# Patient Record
Sex: MALE | Race: WHITE | NOT HISPANIC OR LATINO | Employment: OTHER | URBAN - METROPOLITAN AREA
[De-identification: names, ages, dates, MRNs, and addresses within clinical notes are randomized per-mention and may not be internally consistent; named-entity substitution may affect disease eponyms.]

---

## 2018-04-14 ENCOUNTER — APPOINTMENT (OUTPATIENT)
Dept: GENERAL RADIOLOGY | Facility: CLINIC | Age: 63
End: 2018-04-14
Attending: FAMILY MEDICINE
Payer: COMMERCIAL

## 2018-04-14 ENCOUNTER — HOSPITAL ENCOUNTER (EMERGENCY)
Facility: CLINIC | Age: 63
Discharge: HOME | End: 2018-04-14
Attending: FAMILY MEDICINE | Admitting: FAMILY MEDICINE
Payer: COMMERCIAL

## 2018-04-14 VITALS
WEIGHT: 170 LBS | BODY MASS INDEX: 25.1 KG/M2 | OXYGEN SATURATION: 97 % | DIASTOLIC BLOOD PRESSURE: 96 MMHG | SYSTOLIC BLOOD PRESSURE: 157 MMHG | HEART RATE: 76 BPM | TEMPERATURE: 98.1 F | RESPIRATION RATE: 16 BRPM

## 2018-04-14 DIAGNOSIS — S61.217A LACERATION OF LEFT LITTLE FINGER WITHOUT FOREIGN BODY WITHOUT DAMAGE TO NAIL, INITIAL ENCOUNTER: ICD-10-CM

## 2018-04-14 PROCEDURE — 12001 RPR S/N/AX/GEN/TRNK 2.5CM/<: CPT | Performed by: FAMILY MEDICINE

## 2018-04-14 PROCEDURE — 73140 X-RAY EXAM OF FINGER(S): CPT | Mod: LT

## 2018-04-14 PROCEDURE — 99282 EMERGENCY DEPT VISIT SF MDM: CPT | Mod: 25 | Performed by: FAMILY MEDICINE

## 2018-04-14 PROCEDURE — 12001 RPR S/N/AX/GEN/TRNK 2.5CM/<: CPT | Mod: Z6 | Performed by: FAMILY MEDICINE

## 2018-04-14 PROCEDURE — 99283 EMERGENCY DEPT VISIT LOW MDM: CPT | Performed by: FAMILY MEDICINE

## 2018-04-14 RX ORDER — BUPIVACAINE HYDROCHLORIDE 2.5 MG/ML
15 INJECTION, SOLUTION INFILTRATION; PERINEURAL ONCE
Status: DISCONTINUED | OUTPATIENT
Start: 2018-04-14 | End: 2018-04-14 | Stop reason: HOSPADM

## 2018-04-14 ASSESSMENT — ENCOUNTER SYMPTOMS
ABDOMINAL PAIN: 0
FEVER: 0
PALPITATIONS: 0
COUGH: 0
CHILLS: 0
SHORTNESS OF BREATH: 0
VOMITING: 0
NAUSEA: 0
DYSURIA: 0
FREQUENCY: 0
BLOOD IN STOOL: 0
CONSTIPATION: 0
SORE THROAT: 0
HEADACHES: 0
DIAPHORESIS: 0
DIARRHEA: 0
WHEEZING: 0
SINUS PRESSURE: 0

## 2018-04-14 NOTE — DISCHARGE INSTRUCTIONS
ICD-10-CM    1. Laceration of left little finger without foreign body without damage to nail, initial encounter S61.217A     keep clean and dry. return immed for signs infection. sutures out in 8-10 days.  use glove at work         Extremity Laceration: Stitches, Staples, or Tape  A laceration is a cut through the skin. If it is deep, it may require stitches or staples to close so it can heal. Minor cuts may be treated with surgical tape closures, or skin glue.  X-rays may be done if something may have entered the skin through the cut. You may also need a tetanus shot if you are not up to date on this vaccination.  Home care    Follow the healthcare provider s instructions on how to care for the cut.    Wash your hands with soap and warm water before and after caring for your wound. This is to help prevent infection.    Keep the wound clean and dry. If a bandage was applied and it becomes wet or dirty, replace it. Otherwise, leave it in place for the first 24 hours, then change it once a day or as directed.    If stitches or staples were used, clean the wound daily:    After removing the bandage, wash the area with soap and water. Use a wet cotton swab to loosen and remove any blood or crust that forms.    After cleaning, keep the wound clean and dry. Talk with your healthcare provider before applying any antibiotic ointment to the wound. Reapply the bandage.    You may remove the bandage to shower as usual after the first 24 hours, but don't soak the area in water (no swimming) until the stitches or staples are removed.    If surgical tape closures were used, keep the area clean and dry. If it becomes wet, blot it dry with a towel. Let the surgical tape fall off on its own.    The healthcare provider may prescribe an antibiotic cream or ointment to prevent infection. He or she may also prescribe an antibiotic pill. Don't stop taking this medicine until you have finished the prescribed course or the provider tells  you to stop. The provider may also prescribe medicine for pain. Follow the instructions for taking these medicines.    Avoid activities that may reopen your wound.  Follow-up care  Follow up with your healthcare provider, or as advised. Most skin wounds heal within 10 days. However, an infection may sometimes occur despite proper treatment. Check the wound daily for the signs of infection listed below. Stitches and staples should be removed within 7 to14 days. If surgical tape closures were used, you may remove them after 10 days if they have not fallen off by then.   When to seek medical advice  Call your healthcare provider right away if any of these occur:    Wound bleeding not controlled by direct pressure    Signs of infection, including increasing pain in the wound, increasing wound redness or swelling, or pus or bad odor coming from the wound    Fever of 100.4 F (38 C) or higher or as directed by your healthcare provider    Stitches or staples come apart or fall out or surgical tape falls off before 7 days    Wound edges re-open    Wound changes colors    Numbness occurs around the wound     Decreased movement around the injured area  Date Last Reviewed: 7/1/2017 2000-2017 [employer]. [address] [place] [address]. All rights reserved. This information is not intended as a substitute for professional medical care. Always follow your healthcare professional's instructions.

## 2018-04-14 NOTE — ED AVS SNAPSHOT
[facility] Emergency Department    [address] [address]    Phone:  [phone]    Fax:  [phone]                                       Nadir Griffith   MRN: [id number]    Department:  [facility] Emergency Department   Date of Visit:  4/14/2018           After Visit Summary Signature Page     I have received my discharge instructions, and my questions have been answered. I have discussed any challenges I see with this plan with the nurse or doctor.    ..........................................................................................................................................  Patient/Patient Representative Signature      ..........................................................................................................................................  Patient Representative Print Name and Relationship to Patient    ..................................................               ................................................  Date                                            Time    ..........................................................................................................................................  Reviewed by Signature/Title    ...................................................              ..............................................  Date                                                            Time

## 2018-04-14 NOTE — ED PROVIDER NOTES
History     Chief Complaint   Patient presents with     Hand Pain     L 5th finger injury, shot through with a nail gun     HPI  Nadir Griffith RT handed male  is a 62 year old male who presents after nail gun injury to the left pinky occurring between 1030 to 11 am today when working with a friend.  holding a board while applying the nail gun at an angle on the opposite side of the board and nail passed through board and into finger.  Efrain finger tip away rapidly and nail came loose from this area and bleeding took pressure and time to resolve.      Tdap in 2016      There is no immunization history on file for this patient.      Problem List:    There are no active problems to display for this patient.       Past Medical History:    No past medical history on file.    Past Surgical History:    Past Surgical History:   Procedure Laterality Date     COLONOSCOPY  11/4/2013    Procedure: COMBINED COLONOSCOPY THRU STOMA, BIOPSY BY SNARE;  Colonoscopy;  Surgeon: Garland Hutchinson MD;  Location: [facility]     LASER HOLMIUM LITHOTRIPSY BLADDER N/A 9/19/2016    Procedure: LASER HOLMIUM LITHOTRIPSY BLADDER;  Surgeon: EZEQUIEL Escobar MD;  Location: [facility]       Family History:    No family history on file.    Social History:  Marital Status:  Single [1]  Social History   Substance Use Topics     Smoking status: Former Smoker     Smokeless tobacco: Not on file     Alcohol use Yes        Medications:      potassium citrate (POTASSIUM CITRATE) 10 MEQ (1080 MG) SR tablet   tadalafil (CIALIS) 20 MG tablet   lisinopril (PRINIVIL,ZESTRIL) 20 MG tablet         Review of Systems   Constitutional: Negative for chills, diaphoresis and fever.   HENT: Negative for ear pain, sinus pressure and sore throat.    Eyes: Negative for visual disturbance.   Respiratory: Negative for cough, shortness of breath and wheezing.    Cardiovascular: Negative for chest pain and palpitations.   Gastrointestinal: Negative for abdominal pain, blood in  stool, constipation, diarrhea, nausea and vomiting.   Genitourinary: Negative for dysuria, frequency and urgency.   Skin: Negative for rash.   Neurological: Negative for headaches.   All other systems reviewed and are negative.      Physical Exam   BP: (!) 157/96  Pulse: 76  Temp: 98.1  F (36.7  C)  Resp: 16  Weight: 77.1 kg (170 lb)  SpO2: 97 %      Physical Exam   Constitutional: No distress.   Musculoskeletal:        Left hand: He exhibits tenderness. He exhibits normal range of motion, no bony tenderness and normal capillary refill. Normal sensation noted. Normal strength noted.        Hands:  Skin: He is not diaphoretic.     intact motor function 5th finger left hand at PIP, DIP, MCP in extension, flexion.      ED Course     ED Course     Laceration repair  Date/Time: 4/14/2018 12:45 PM  Performed by: KEVIN TIMMONS  Authorized by: KEIVN TIMMONS   Consent: Verbal consent obtained.  Risks and benefits: risks, benefits and alternatives were discussed  Consent given by: patient  Body area: upper extremity  Location details: left small finger  Laceration length: 1 cm  Foreign bodies: no foreign bodies  Tendon involvement: none  Nerve involvement: none  Vascular damage: no  Anesthesia: digital block    Anesthesia:  Local Anesthetic: bupivacaine 0.25% without epinephrine  Anesthetic total: 10 mL    Sedation:  Patient sedated: no  Irrigation solution: saline  Irrigation method: syringe  Amount of cleaning: standard  Debridement: none  Degree of undermining: none  Skin closure: 4-0 nylon and Ethilon  Number of sutures: 4  Technique: running  Approximation: close  Approximation difficulty: simple  Dressing: gauze roll  Patient tolerance: Patient tolerated the procedure well with no immediate complications                     Critical Care time:  none               Results for orders placed or performed during the hospital encounter of 04/14/18 (from the past 24 hour(s))   Fingers XR, 2-3 views, left    Narrative     LEFT FINGER TWO OR MORE VIEWS  4/14/2018 11:51 AM     HISTORY:  Nail gun injury.       Impression    IMPRESSION: Three views of the left little finger are performed. Soft  tissue defect is noted near the distal aspect of the finger but no  radiopaque foreign body or fracture is appreciated.    JATIN HUERTAS MD       Medications   bupivacaine (MARCAINE) 0.25 % injection 37.5 mg (not administered)       Assessments & Plan (with Medical Decision Making)     MDM: Nadir Griffith is a 62 year old male who presented with a finger injury from nail gun.  This appears to have grazed the outer aspect of the left hand fifth finger resulting in a flap laceration when he pulled his hand away.  This has bled fairly significantly prior to his presentation.  Following digital block with bupivacaine running suture ×4 was used to close the area.  He tolerated this procedure well.  His tetanus is up-to-date.  Discussed follow-up in clinic.  Sutures out in approximately 8-10 days.    I have reviewed the nursing notes.    I have reviewed the findings, diagnosis, plan and need for follow up with the patient.       New Prescriptions    No medications on file       Final diagnoses:   Laceration of left little finger without foreign body without damage to nail, initial encounter - keep clean and dry. return immed for signs infection. sutures out in 8-10 days.  use glove at work       4/14/2018   [facility] EMERGENCY DEPARTMENT     Reece Morales MD  04/14/18 [id number]

## 2018-04-14 NOTE — ED AVS SNAPSHOT
[facility] Emergency Department    [address] [address]    Phone:  [phone]    Fax:  [phone]                                       Nadir Griffith   MRN: [id number]    Department:  [facility] Emergency Department   Date of Visit:  4/14/2018           Patient Information     Date Of Birth          1955        Your diagnoses for this visit were:     Laceration of left little finger without foreign body without damage to nail, initial encounter keep clean and dry. return immed for signs infection. sutures out in 8-10 days.  use glove at work       You were seen by Reece Morales MD.      Follow-up Information     Follow up with Clinic, [facility] In 8 days.    Contact information:    [facility] [facility] [id number] [phone]          Follow up with [facility] Emergency Department.    Specialty:  EMERGENCY MEDICINE    Why:  As needed, If symptoms worsen    Contact information:    [address] [place] [address] [phone]    Additional information:    The medical center is located at   [address] (between [address] in [place], [place] miles north   of [place]).        Discharge Instructions         ICD-10-CM    1. Laceration of left little finger without foreign body without damage to nail, initial encounter S61.217A     keep clean and dry. return immed for signs infection. sutures out in 8-10 days.  use glove at work         Extremity Laceration: Stitches, Staples, or Tape  A laceration is a cut through the skin. If it is deep, it may require stitches or staples to close so it can heal. Minor cuts may be treated with surgical tape closures, or skin glue.  X-rays may be done if something may have entered the skin through the cut. You may also need a tetanus shot if you are not up to date on this vaccination.  Home care    Follow the healthcare provider s instructions on how to care for the cut.    Wash your  hands with soap and warm water before and after caring for your wound. This is to help prevent infection.    Keep the wound clean and dry. If a bandage was applied and it becomes wet or dirty, replace it. Otherwise, leave it in place for the first 24 hours, then change it once a day or as directed.    If stitches or staples were used, clean the wound daily:    After removing the bandage, wash the area with soap and water. Use a wet cotton swab to loosen and remove any blood or crust that forms.    After cleaning, keep the wound clean and dry. Talk with your healthcare provider before applying any antibiotic ointment to the wound. Reapply the bandage.    You may remove the bandage to shower as usual after the first 24 hours, but don't soak the area in water (no swimming) until the stitches or staples are removed.    If surgical tape closures were used, keep the area clean and dry. If it becomes wet, blot it dry with a towel. Let the surgical tape fall off on its own.    The healthcare provider may prescribe an antibiotic cream or ointment to prevent infection. He or she may also prescribe an antibiotic pill. Don't stop taking this medicine until you have finished the prescribed course or the provider tells you to stop. The provider may also prescribe medicine for pain. Follow the instructions for taking these medicines.    Avoid activities that may reopen your wound.  Follow-up care  Follow up with your healthcare provider, or as advised. Most skin wounds heal within 10 days. However, an infection may sometimes occur despite proper treatment. Check the wound daily for the signs of infection listed below. Stitches and staples should be removed within 7 to14 days. If surgical tape closures were used, you may remove them after 10 days if they have not fallen off by then.   When to seek medical advice  Call your healthcare provider right away if any of these occur:    Wound bleeding not controlled by direct  pressure    Signs of infection, including increasing pain in the wound, increasing wound redness or swelling, or pus or bad odor coming from the wound    Fever of 100.4 F (38 C) or higher or as directed by your healthcare provider    Stitches or staples come apart or fall out or surgical tape falls off before 7 days    Wound edges re-open    Wound changes colors    Numbness occurs around the wound     Decreased movement around the injured area  Date Last Reviewed: 7/1/2017 2000-2017 [employer]. [address] [place] [address]. All rights reserved. This information is not intended as a substitute for professional medical care. Always follow your healthcare professional's instructions.          24 Hour Appointment Hotline       To make an appointment at any [facility], call [phone] ([phone]). If you don't have a family doctor or clinic, we will help you find one. [facility] clinics are conveniently located to serve the needs of you and your family.             Review of your medicines      Our records show that you are taking the medicines listed below. If these are incorrect, please call your family doctor or clinic.        Dose / Directions Last dose taken    lisinopril 20 MG tablet   Commonly known as:  PRINIVIL/ZESTRIL   Dose:  20 mg        Take 20 mg by mouth   Refills:  0        potassium citrate 10 MEQ (1080 MG) CR tablet   Commonly known as:  UROCIT-K   Dose:  20 mEq   Quantity:  120 tablet        Take 2 tablets (20 mEq) by mouth 2 times daily (with meals)   Refills:  3        tadalafil 20 MG tablet   Commonly known as:  CIALIS   Dose:  20 mg   Quantity:  6 tablet        Take 1 tablet (20 mg) by mouth daily as needed for erectile dysfunction Never use with nitroglycerin, terazosin or doxazosin.   Refills:  3                Procedures and tests performed during your visit     Fingers XR, 2-3 views, left    Laceration repair      Orders Needing Specimen Collection      "None      Pending Results     No orders found from 2018 to 4/15/2018.            Pending Culture Results     No orders found from 2018 to 4/15/2018.            Pending Results Instructions     If you had any lab results that were not finalized at the time of your Discharge, you can call the ED Lab Result RN at [phone]. You will be contacted by this team for any positive Lab results or changes in treatment. The nurses are available 7 days a week from 10A to 6:30P.  You can leave a message 24 hours per day and they will return your call.        Test Results From Your Hospital Stay        2018 12:09 PM      Narrative     LEFT FINGER TWO OR MORE VIEWS  2018 11:51 AM     HISTORY:  Nail gun injury.         Impression     IMPRESSION: Three views of the left little finger are performed. Soft  tissue defect is noted near the distal aspect of the finger but no  radiopaque foreign body or fracture is appreciated.    JATIN HUERTAS MD                Thank you for choosing [facility]       Thank you for choosing [facility] for your care. Our goal is always to provide you with excellent care. Hearing back from our patients is one way we can continue to improve our services. Please take a few minutes to complete the written survey that you may receive in the mail after you visit with us. Thank you!        [facility] Information     [facility] lets you send messages to your doctor, view your test results, renew your prescriptions, schedule appointments and more. To sign up, go to [email or web address] . Click on \"Log in\" on the left side of the screen, which will take you to the Welcome page. Then click on \"Sign up Now\" on the right side of the page.     You will be asked to enter the access code listed below, as well as some personal information. Please follow the directions to create your username and password.     Your access code is: [email or web address]  Expires: 2018 12:48 PM     Your access code will  in 90 " days. If you need help or a new code, please call your [facility] clinic or [phone].        Care EveryWhere ID     This is your Care EveryWhere ID. This could be used by other organizations to access your [facility] medical records  [id number]        Equal Access to Services     MAYNOR MCCULLOUGH : Abdi Nunn, waselamda luqadaha, qaybta kaalmada yamilka, juliana valencia. So [facility] [phone].    ATENCIÓN: Si habla español, tiene a rubio disposición servicios gratuitos de asistencia lingüística. Llame al [phone].    We comply with applicable federal civil rights laws and [place] laws. We do not discriminate on the basis of race, color, national origin, age, disability, sex, sexual orientation, or gender identity.            After Visit Summary       This is your record. Keep this with you and show to your community pharmacist(s) and doctor(s) at your next visit.

## 2019-01-19 ENCOUNTER — HOSPITAL ENCOUNTER (EMERGENCY)
Facility: CLINIC | Age: 64
Discharge: HOME | End: 2019-01-19
Attending: PHYSICIAN ASSISTANT | Admitting: PHYSICIAN ASSISTANT
Payer: COMMERCIAL

## 2019-01-19 ENCOUNTER — APPOINTMENT (OUTPATIENT)
Dept: GENERAL RADIOLOGY | Facility: CLINIC | Age: 64
End: 2019-01-19
Payer: COMMERCIAL

## 2019-01-19 VITALS
BODY MASS INDEX: 27.32 KG/M2 | RESPIRATION RATE: 18 BRPM | SYSTOLIC BLOOD PRESSURE: 155 MMHG | WEIGHT: 185 LBS | OXYGEN SATURATION: 94 % | HEART RATE: 111 BPM | DIASTOLIC BLOOD PRESSURE: 94 MMHG | TEMPERATURE: 98 F

## 2019-01-19 DIAGNOSIS — S61.313A LACERATION OF LEFT MIDDLE FINGER WITHOUT FOREIGN BODY WITH DAMAGE TO NAIL, INITIAL ENCOUNTER: ICD-10-CM

## 2019-01-19 PROCEDURE — 99213 OFFICE O/P EST LOW 20 MIN: CPT | Mod: Z6 | Performed by: PHYSICIAN ASSISTANT

## 2019-01-19 PROCEDURE — 73140 X-RAY EXAM OF FINGER(S): CPT | Mod: LT

## 2019-01-19 PROCEDURE — G0463 HOSPITAL OUTPT CLINIC VISIT: HCPCS | Performed by: PHYSICIAN ASSISTANT

## 2019-01-19 ASSESSMENT — ENCOUNTER SYMPTOMS
CONSTITUTIONAL NEGATIVE: 1
MUSCULOSKELETAL NEGATIVE: 1
RESPIRATORY NEGATIVE: 1
NEUROLOGICAL NEGATIVE: 1
CARDIOVASCULAR NEGATIVE: 1
WOUND: 1

## 2019-01-19 NOTE — ED AVS SNAPSHOT
[facility] Emergency Department  [address] [address]  Phone:  [phone]  Fax:  [phone]                                    Nadir Griffith   MRN: [id number]    Department:  [facility] Emergency Department   Date of Visit:  1/19/2019           After Visit Summary Signature Page    I have received my discharge instructions, and my questions have been answered. I have discussed any challenges I see with this plan with the nurse or doctor.    ..........................................................................................................................................  Patient/Patient Representative Signature      ..........................................................................................................................................  Patient Representative Print Name and Relationship to Patient    ..................................................               ................................................  Date                                   Time    ..........................................................................................................................................  Reviewed by Signature/Title    ...................................................              ..............................................  Date                                               Time          22EPIC Rev 08/18

## 2019-01-19 NOTE — ED PROVIDER NOTES
History     Chief Complaint   Patient presents with     Laceration     HPI  Nadir Griffith is a 63 year old male who presents with complaints of laceration to tip of left middle finger.  Patient states he accidentally cut his finger against a table saw.  This occurred just prior to arrival.  He is moving his finger without difficulties.  Bleeding is controlled.  Patient's last tetanus was in 2016.      Allergies:  Allergies   Allergen Reactions     Nka [No Known Allergies]        Problem List:    There are no active problems to display for this patient.       Past Medical History:    History reviewed. No pertinent past medical history.    Past Surgical History:    Past Surgical History:   Procedure Laterality Date     COLONOSCOPY  11/4/2013    Procedure: COMBINED COLONOSCOPY THRU STOMA, BIOPSY BY SNARE;  Colonoscopy;  Surgeon: Garland Hutchinson MD;  Location: [facility]     LASER HOLMIUM LITHOTRIPSY BLADDER N/A 9/19/2016    Procedure: LASER HOLMIUM LITHOTRIPSY BLADDER;  Surgeon: EZEQUIEL Escobar MD;  Location: [facility]       Family History:    History reviewed. No pertinent family history.    Social History:  Marital Status:  Single [1]  Social History     Tobacco Use     Smoking status: Former Smoker   Substance Use Topics     Alcohol use: Yes     Drug use: Not on file        Medications:      lisinopril (PRINIVIL,ZESTRIL) 20 MG tablet   potassium citrate (POTASSIUM CITRATE) 10 MEQ (1080 MG) SR tablet   tadalafil (CIALIS) 20 MG tablet         Review of Systems   Constitutional: Negative.    Respiratory: Negative.    Cardiovascular: Negative.    Musculoskeletal: Negative.    Skin: Positive for wound.   Neurological: Negative.    All other systems reviewed and are negative.      Physical Exam   BP: (!) 155/94  Pulse: 111  Temp: 98  F (36.7  C)  Resp: 18  Weight: 83.9 kg (185 lb)  SpO2: 94 %      Physical Exam   Constitutional: He appears well-developed and well-nourished. No distress.   HENT:   Head: Normocephalic and  atraumatic.   Eyes: Conjunctivae are normal.   Neck: Neck supple.   Pulmonary/Chest: Effort normal.   Musculoskeletal: Normal range of motion.        Left hand: He exhibits tenderness and laceration. He exhibits normal range of motion, normal capillary refill, no deformity and no swelling. Normal sensation noted. Normal strength noted.   2 cm jagged laceration to distal left middle finger.  Wound is void of a piece of tissue.  Wound extends to the lateral aspect of the nail.  No tendon involvement.  Full active and passive ROM of finger at all joints.  Full strength of finger joints with flexion and extension against resistance.  Sensation intact.   Neurological: He is alert. He has normal strength. No sensory deficit.   Skin: Skin is warm and dry.       ED Course        Procedures    Results for orders placed or performed during the hospital encounter of 01/19/19 (from the past 24 hour(s))   Fingers XR, 2-3 views, left    Narrative    XR FINGER LT G/E 2 VW 1/19/2019 2:41 PM     HISTORY: table saw laceration to tip of finger      Impression    IMPRESSION: Negative exam. No apparent radiopaque foreign body.    LUIS DUENAS MD       Medications - No data to display    Assessments & Plan (with Medical Decision Making)     Pt is a 63 year old male who presents with complaints of laceration to tip of left middle finger.  Patient states he accidentally cut his finger against a table saw.  This occurred just prior to arrival.  He is moving his finger without difficulties.  Bleeding is controlled.  Patient's last tetanus was in 2016.  Pt is afebrile on arrival.  Exam as above.  X-rays of left middle finger were negative for fracture or foreign body.  Discussed results with patient.  Patient's laceration was extensively irrigated and a tube gauze dressing was applied.  There is nothing to suture at this time as the wound is void of a piece of tissue.  Return precautions were reviewed.  Hand-outs were provided.    Patient  was instructed to follow-up with PCP if no improvement in 5-7 days for continued care and management or sooner if new or worsening symptoms.  He is to return to the ED for persistent and/or worsening symptoms.  Patient expressed understanding of the diagnosis and plan and was discharged home in good condition.    I have reviewed the nursing notes.    I have reviewed the findings, diagnosis, plan and need for follow up with the patient.       Medication List      There are no discharge medications for this visit.         Final diagnoses:   Laceration of left middle finger without foreign body with damage to nail, initial encounter       1/19/2019   [facility] EMERGENCY DEPARTMENT      Disclaimer:  This note consists of symbols derived from keyboarding, dictation and/or voice recognition software.  As a result, there may be errors in the script that have gone undetected.  Please consider this when interpreting information found in this chart.     Maren Simon PA-C  01/19/19 1944

## 2020-09-08 ENCOUNTER — HOSPITAL ENCOUNTER (EMERGENCY)
Facility: CLINIC | Age: 65
Discharge: HOME | End: 2020-09-09
Attending: EMERGENCY MEDICINE | Admitting: EMERGENCY MEDICINE
Payer: COMMERCIAL

## 2020-09-08 DIAGNOSIS — R31.0 GROSS HEMATURIA: ICD-10-CM

## 2020-09-08 DIAGNOSIS — N30.01 ACUTE CYSTITIS WITH HEMATURIA: ICD-10-CM

## 2020-09-08 LAB
ALBUMIN UR-MCNC: >499 MG/DL
ANION GAP SERPL CALCULATED.3IONS-SCNC: 6 MMOL/L (ref 3–14)
APPEARANCE UR: ABNORMAL
BASOPHILS # BLD AUTO: 0 10E9/L (ref 0–0.2)
BASOPHILS NFR BLD AUTO: 0.6 %
BILIRUB UR QL STRIP: NEGATIVE
BUN SERPL-MCNC: 27 MG/DL (ref 7–30)
CALCIUM SERPL-MCNC: 8.4 MG/DL (ref 8.5–10.1)
CHLORIDE SERPL-SCNC: 107 MMOL/L (ref 94–109)
CO2 SERPL-SCNC: 26 MMOL/L (ref 20–32)
COLOR UR AUTO: YELLOW
CREAT SERPL-MCNC: 1.11 MG/DL (ref 0.66–1.25)
DIFFERENTIAL METHOD BLD: NORMAL
EOSINOPHIL # BLD AUTO: 0.1 10E9/L (ref 0–0.7)
EOSINOPHIL NFR BLD AUTO: 0.9 %
ERYTHROCYTE [DISTWIDTH] IN BLOOD BY AUTOMATED COUNT: 12.6 % (ref 10–15)
GFR SERPL CREATININE-BSD FRML MDRD: 70 ML/MIN/{1.73_M2}
GLUCOSE SERPL-MCNC: 108 MG/DL (ref 70–99)
GLUCOSE UR STRIP-MCNC: 50 MG/DL
HCT VFR BLD AUTO: 44.8 % (ref 40–53)
HGB BLD-MCNC: 15.2 G/DL (ref 13.3–17.7)
HGB UR QL STRIP: ABNORMAL
IMM GRANULOCYTES # BLD: 0 10E9/L (ref 0–0.4)
IMM GRANULOCYTES NFR BLD: 0.1 %
KETONES UR STRIP-MCNC: NEGATIVE MG/DL
LEUKOCYTE ESTERASE UR QL STRIP: NEGATIVE
LYMPHOCYTES # BLD AUTO: 1.9 10E9/L (ref 0.8–5.3)
LYMPHOCYTES NFR BLD AUTO: 27.4 %
MCH RBC QN AUTO: 31.1 PG (ref 26.5–33)
MCHC RBC AUTO-ENTMCNC: 33.9 G/DL (ref 31.5–36.5)
MCV RBC AUTO: 92 FL (ref 78–100)
MONOCYTES # BLD AUTO: 0.6 10E9/L (ref 0–1.3)
MONOCYTES NFR BLD AUTO: 8.9 %
MUCOUS THREADS #/AREA URNS LPF: PRESENT /LPF
NEUTROPHILS # BLD AUTO: 4.3 10E9/L (ref 1.6–8.3)
NEUTROPHILS NFR BLD AUTO: 62.1 %
NITRATE UR QL: NEGATIVE
NRBC # BLD AUTO: 0 10*3/UL
NRBC BLD AUTO-RTO: 0 /100
PH UR STRIP: 6 PH (ref 5–7)
PLATELET # BLD AUTO: 174 10E9/L (ref 150–450)
POTASSIUM SERPL-SCNC: 3.8 MMOL/L (ref 3.4–5.3)
RBC # BLD AUTO: 4.89 10E12/L (ref 4.4–5.9)
RBC #/AREA URNS AUTO: >182 /HPF (ref 0–2)
SODIUM SERPL-SCNC: 139 MMOL/L (ref 133–144)
SOURCE: ABNORMAL
SP GR UR STRIP: 1.02 (ref 1–1.03)
UROBILINOGEN UR STRIP-MCNC: 0 MG/DL (ref 0–2)
WBC # BLD AUTO: 6.9 10E9/L (ref 4–11)
WBC #/AREA URNS AUTO: >182 /HPF (ref 0–5)
WBC CLUMPS #/AREA URNS HPF: PRESENT /HPF

## 2020-09-08 PROCEDURE — 99284 EMERGENCY DEPT VISIT MOD MDM: CPT | Mod: Z6 | Performed by: EMERGENCY MEDICINE

## 2020-09-08 PROCEDURE — 80048 BASIC METABOLIC PNL TOTAL CA: CPT | Performed by: EMERGENCY MEDICINE

## 2020-09-08 PROCEDURE — 85025 COMPLETE CBC W/AUTO DIFF WBC: CPT | Performed by: EMERGENCY MEDICINE

## 2020-09-08 PROCEDURE — 99283 EMERGENCY DEPT VISIT LOW MDM: CPT | Performed by: EMERGENCY MEDICINE

## 2020-09-08 PROCEDURE — 87086 URINE CULTURE/COLONY COUNT: CPT | Performed by: EMERGENCY MEDICINE

## 2020-09-08 PROCEDURE — 81001 URINALYSIS AUTO W/SCOPE: CPT | Performed by: EMERGENCY MEDICINE

## 2020-09-08 RX ORDER — CEFDINIR 300 MG/1
300 CAPSULE ORAL 2 TIMES DAILY
Qty: 10 CAPSULE | Refills: 0 | Status: SHIPPED | OUTPATIENT
Start: 2020-09-08 | End: 2020-09-13

## 2020-09-08 RX ORDER — CEFDINIR 300 MG/1
300 CAPSULE ORAL ONCE
Status: COMPLETED | OUTPATIENT
Start: 2020-09-08 | End: 2020-09-09

## 2020-09-08 NOTE — LETTER
September 9, 2020      To Whom It May Concern:      Nadir Griffith was seen in our Emergency Department today, 09/09/20.  May return to work on 9/12/2020 without restriction.    Sincerely,        Eduin Leo MD

## 2020-09-08 NOTE — ED AVS SNAPSHOT
[facility] Emergency Department  [address] [address]  Phone:  [phone]  Fax:  [phone]                                    Nadir Griffith   MRN: [id number]    Department:  [facility] Emergency Department   Date of Visit:  9/8/2020           After Visit Summary Signature Page    I have received my discharge instructions, and my questions have been answered. I have discussed any challenges I see with this plan with the nurse or doctor.    ..........................................................................................................................................  Patient/Patient Representative Signature      ..........................................................................................................................................  Patient Representative Print Name and Relationship to Patient    ..................................................               ................................................  Date                                   Time    ..........................................................................................................................................  Reviewed by Signature/Title    ...................................................              ..............................................  Date                                               Time          22EPIC Rev 08/18

## 2020-09-09 VITALS
TEMPERATURE: 98.2 F | DIASTOLIC BLOOD PRESSURE: 96 MMHG | HEART RATE: 80 BPM | BODY MASS INDEX: 27.32 KG/M2 | RESPIRATION RATE: 18 BRPM | SYSTOLIC BLOOD PRESSURE: 134 MMHG | OXYGEN SATURATION: 97 % | WEIGHT: 185 LBS

## 2020-09-09 PROCEDURE — 25000132 ZZH RX MED GY IP 250 OP 250 PS 637: Performed by: EMERGENCY MEDICINE

## 2020-09-09 RX ADMIN — CEFDINIR 300 MG: 300 CAPSULE ORAL at 00:10

## 2020-09-09 ASSESSMENT — ENCOUNTER SYMPTOMS
SHORTNESS OF BREATH: 0
NAUSEA: 0
HEMATURIA: 1
DIARRHEA: 0
CHILLS: 1
FREQUENCY: 1
DYSURIA: 1
ABDOMINAL PAIN: 0
LIGHT-HEADEDNESS: 0
COUGH: 0
BACK PAIN: 0
SORE THROAT: 0
HEADACHES: 0
FEVER: 0
VOMITING: 0

## 2020-09-09 NOTE — ED PROVIDER NOTES
"  History     Chief Complaint   Patient presents with     Hematuria     about 1/2 hr ago, pain at end of voiding     HPI  Nadir Griffith is a 65 year old male with history of renal calculi, bladder stone (status post lithotripsy), hypertension, dyslipidemia, sleep apnea, right bundle branch block, who presents for evaluation of hematuria and dysuria.  Patient reports increased urinary frequency for past few days.  Today he has had some dysuria and this evening he had gross hematuria.  He reports possible chills earlier today but said it was difficult to say because it was cold outside and it was 56 degrees in his house when he returned home.  Has inconsistent urinary stream.  Says sometimes it will start and then stop and then resume again.  Says he had issues like this with his prior stone.  Says he was told to \"wiggle my hips\" which improved urine flow.  Has been doing this intermittently for the past month.  No measured fevers.  Denies nausea, vomiting, back pain, flank pain, abdominal pain.  No urethral discharge.  No scrotal pain.  Says that he did not have much pain with previous kidney stones.  Eating and drinking well.    The patient's PMHx, Surgical Hx, Allergies, and Medications were all reviewed with the patient.    ==================================================================    CHART REVIEW:    CT stone protocol 7/23/2020  KIDNEY/BLADDER: No evidence of calculi in the kidneys or ureters. There are 3  bladder calculi measure 9 mm, 9 mm, and 8 mm respectively with a small  additional 2 mm bladder calculus. No evidence of bladder wall thickening. No  evidence of hydronephrosis. There are several low-attenuation probable left  renal cysts.  IMPRESSION:   1.  No evidence of calculi in the kidneys or ureters.  2.  Multiple bladder calculi.  3.  No evidence of hydronephrosis.  4.  Small sites of pelvic bone sclerosis. Recommend correlation with PSA levels.    END CHART " REVIEW  ==================================================================      Allergies:  Allergies   Allergen Reactions     Nka [No Known Allergies]        Problem List:    There are no active problems to display for this patient.       Past Medical History:    No past medical history on file.    Past Surgical History:    Past Surgical History:   Procedure Laterality Date     COLONOSCOPY  11/4/2013    Procedure: COMBINED COLONOSCOPY THRU STOMA, BIOPSY BY SNARE;  Colonoscopy;  Surgeon: Garland Hutchinson MD;  Location: [facility]     LASER HOLMIUM LITHOTRIPSY BLADDER N/A 9/19/2016    Procedure: LASER HOLMIUM LITHOTRIPSY BLADDER;  Surgeon: EZQEUIEL Escobar MD;  Location: [facility]       Family History:    No family history on file.    Social History:  Marital Status:  Single [1]  Social History     Tobacco Use     Smoking status: Former Smoker   Substance Use Topics     Alcohol use: Yes     Drug use: Not on file        Medications:    cefdinir (OMNICEF) 300 MG capsule  lisinopril (PRINIVIL,ZESTRIL) 20 MG tablet  potassium citrate (POTASSIUM CITRATE) 10 MEQ (1080 MG) SR tablet  tadalafil (CIALIS) 20 MG tablet          Review of Systems   Constitutional: Positive for chills. Negative for fever.   HENT: Negative for sore throat.    Eyes: Negative for visual disturbance.   Respiratory: Negative for cough and shortness of breath.    Cardiovascular: Negative for chest pain.   Gastrointestinal: Negative for abdominal pain, diarrhea, nausea and vomiting.   Endocrine: Negative for polyuria.   Genitourinary: Positive for dysuria, frequency and hematuria. Negative for discharge and testicular pain.   Musculoskeletal: Negative for back pain.   Allergic/Immunologic: Negative for immunocompromised state.   Neurological: Negative for light-headedness and headaches.       Physical Exam   BP: (!) 147/115  Pulse: 91  Temp: 98.2  F (36.8  C)  Resp: 18  Weight: 83.9 kg (185 lb)  SpO2: 97 %    Physical Exam  GEN: Awake, alert, and cooperative.  No acute distress.  Anxious appearing  HENT: MMM. External ears and nose normal bilaterally.  EYES: EOM intact. Conjunctiva clear.   NECK: Supple, symmetric.  CV : Extremities warm and well-perfused.  Brisk capillary refill.  PULM: Normal effort.   ABD: Soft, non-tender, non-distended. No rebound or guarding.   BACK: No CVA tenderness.  No midline spinal tenderness.  No overt signs of trauma.  NEURO: Normal speech. Following commands. CN II-XII grossly intact. Answering questions and interacting appropriately.   EXT: No gross deformity.   INT: Warm. No diaphoresis. Normal color.        ED Course        Procedures           Critical Care time:  none               Results for orders placed or performed during the hospital encounter of 09/08/20 (from the past 24 hour(s))   UA reflex to Microscopic   Result Value Ref Range    Color Urine Yellow     Appearance Urine Cloudy     Glucose Urine 50 (A) NEG^Negative mg/dL    Bilirubin Urine Negative NEG^Negative    Ketones Urine Negative NEG^Negative mg/dL    Specific Gravity Urine 1.022 1.003 - 1.035    Blood Urine Large (A) NEG^Negative    pH Urine 6.0 5.0 - 7.0 pH    Protein Albumin Urine >499 (A) NEG^Negative mg/dL    Urobilinogen mg/dL 0.0 0.0 - 2.0 mg/dL    Nitrite Urine Negative NEG^Negative    Leukocyte Esterase Urine Negative NEG^Negative    Source Midstream Urine     RBC Urine >182 (H) 0 - 2 /HPF    WBC Urine >182 (H) 0 - 5 /HPF    WBC Clumps Present (A) NEG^Negative /HPF    Mucous Urine Present (A) NEG^Negative /LPF   CBC with platelets differential   Result Value Ref Range    WBC 6.9 4.0 - 11.0 10e9/L    RBC Count 4.89 4.4 - 5.9 10e12/L    Hemoglobin 15.2 13.3 - 17.7 g/dL    Hematocrit 44.8 40.0 - 53.0 %    MCV 92 78 - 100 fl    MCH 31.1 26.5 - 33.0 pg    MCHC 33.9 31.5 - 36.5 g/dL    RDW 12.6 10.0 - 15.0 %    Platelet Count 174 150 - 450 10e9/L    Diff Method Automated Method     % Neutrophils 62.1 %    % Lymphocytes 27.4 %    % Monocytes 8.9 %    % Eosinophils  0.9 %    % Basophils 0.6 %    % Immature Granulocytes 0.1 %    Nucleated RBCs 0 0 /100    Absolute Neutrophil 4.3 1.6 - 8.3 10e9/L    Absolute Lymphocytes 1.9 0.8 - 5.3 10e9/L    Absolute Monocytes 0.6 0.0 - 1.3 10e9/L    Absolute Eosinophils 0.1 0.0 - 0.7 10e9/L    Absolute Basophils 0.0 0.0 - 0.2 10e9/L    Abs Immature Granulocytes 0.0 0 - 0.4 10e9/L    Absolute Nucleated RBC 0.0    Basic metabolic panel   Result Value Ref Range    Sodium 139 133 - 144 mmol/L    Potassium 3.8 3.4 - 5.3 mmol/L    Chloride 107 94 - 109 mmol/L    Carbon Dioxide 26 20 - 32 mmol/L    Anion Gap 6 3 - 14 mmol/L    Glucose 108 (H) 70 - 99 mg/dL    Urea Nitrogen 27 7 - 30 mg/dL    Creatinine 1.11 0.66 - 1.25 mg/dL    GFR Estimate 70 >60 mL/min/[1.73_m2]    GFR Estimate If Black 81 >60 mL/min/[1.73_m2]    Calcium 8.4 (L) 8.5 - 10.1 mg/dL       Medications   cefdinir (OMNICEF) capsule 300 mg (300 mg Oral Given 9/9/20 0010)       Assessments & Plan (with Medical Decision Making)   65 year old male with past medical history of bladder calculi, renal calculi, hypertension, hyperlipidemia, right bundle branch block, with several days of urinary frequency, 1 day of dysuria and hematuria.  On arrival to Emergency Department, vital signs were blood pressure 147/115, temperature 98.2, pulse 91, respiratory rate 18.     Urinalysis notable for gross hematuria.  Greater than 182 RBCs and greater than 182 WBCs with white blood cell clumps present.  Symptoms consistent with acute urinary tract infection.  Urine sent for culture and will treat with cefdinir.  First dose given in emergency department.  Chart review shows that patient does have history of bladder calculi with lithotripsy.  Chart review also shows that he had a visit for flank pain at an outside hospital a few months ago and CT scan at that time did show multiple bladder calculi no hydronephrosis no sign of renal calculus.  Patient denies any recent back flank or abdominal pain.  This would  be very atypical for ureterolithiasis.  CBC normal and CMP grossly normal.  No CVA tenderness on exam.  Is afebrile and no tachycardia.  He is frequently passing urine on the emergency department making distal obstruction due to bladder calculi unlikely.  Plan to treat with antibiotics and expect improvement of symptoms in 2 to 3 days.  Can take ibuprofen as needed for pain.  Will need to follow-up with primary care provider and also urology.  If he develops fever, chills, nausea, vomiting, or recommend imaging to evaluate for obstructing stone.  Strict ED return precautions discussed.  Expresses agreement understanding of plan and discharged in stable condition. Urology referral placed.       I have reviewed the nursing notes.         Discharge Medication List as of 9/9/2020 12:16 AM      START taking these medications    Details   cefdinir (OMNICEF) 300 MG capsule Take 1 capsule (300 mg) by mouth 2 times daily for 5 days, Disp-10 capsule,R-0, E-Prescribe             Final diagnoses:   Acute cystitis with hematuria   Gross hematuria     Eduin Leo MD    9/8/2020   [facility] EMERGENCY DEPARTMENT    Disclaimer: This note consists of words and symbols derived from keyboarding and dictation using voice recognition software.  As a result, there may be errors that have gone undetected.  Please consider this when interpreting information found in this note.             Eduin Leo MD  09/09/20 0130

## 2020-09-09 NOTE — DISCHARGE INSTRUCTIONS
You were found to have a urinary tract infection.  Antibiotics twice daily as prescribed for 4 days.  I would expect your symptoms to improve after 2 to 3 days.  You should follow-up with your primary care physician in 3 to 4 days to monitor treatment response and to follow-up on urine culture results.  You should also follow-up with urology for your bladder stones and blood in your urine.  If you develop fever (greater than 100.4), chills, flank or abdominal pain, inability to urinate, or other new or concerning signs or symptoms, please return to emergency department for further evaluation and treatment.  You may also take 400 to 600 mg of ibuprofen 3 times a day for pain and discomfort.

## 2020-09-09 NOTE — ED NOTES
"   Pt arrived via triage, ambulatory, in no acute distress.  Pt is quite anxious, talking fast and pacing.   Pt states he is \"peeing blood\" and it started 30 min ago.  He has frequency and small amounts of BRB in urine.  Pt reports no pain at rest and slight pain at \"the end when I pee\".  Pt reports going to the PCP a couple times in the past few months with flank pain, and dark urine, and they stated everything was fine.  Pt has a h/o a 6mm bladder stone, 3 yrs ago, and feels this is what is happening now.      Pt voided approx 3 ml of urine that is bright red.  Pt states he will be able to void more in about 5 min.    PLAN:  Will send UA and await MD assessment and orders.  "

## 2020-09-09 NOTE — ED NOTES
18g IV started in R AC, labs drawn and sent.  Pt updated on plan of care.  Pt resting comfortably, in no distress, watching TV.

## 2020-09-10 LAB
BACTERIA SPEC CULT: NO GROWTH
SPECIMEN SOURCE: NORMAL

## 2020-09-10 NOTE — RESULT ENCOUNTER NOTE
"Final urine culture report shows \"NO GROWTH\" and is NEGATIVE.  Emergency Dept discharge antibiotic: Cefdinir (Omnicef) 300 mg capsule, 1 capsule (300 mg) by mouth 2 times daily for 5 days  Is ED discharge Rx antibiotic for UTI only (Yes/No): Yes  Recommendations per [facility] ED Lab result protocol - Urine culture protocol."

## 2020-09-11 ENCOUNTER — TELEPHONE (OUTPATIENT)
Dept: UROLOGY | Facility: CLINIC | Age: 65
End: 2020-09-11

## 2020-09-11 NOTE — TELEPHONE ENCOUNTER
Reason for Call:  Other     Detailed comments: Pt thinks he has bladder stones again. States he was urinating blood on 09/08 and was seen in the ER, diagnosed with bladder infection and started on antibiotics. Was called yesterday and told he did not have an infection and to stop the antibiotics. Had bladder stones in 2016.     He has been advised to be seen by urology and no openings for 3 months - Please advise     Phone Number Patient can be reached at: Cell number on file:    Telephone Information:   Mobile [phone]       Best Time: Any    Can we leave a detailed message on this number? YES    Call taken on 9/11/2020 at 9:15 AM by Denise Behrendt

## 2020-09-11 NOTE — TELEPHONE ENCOUNTER
I spoke to Nadir today. He went into the ED recently he was positive for chills frequency hematuria and dysuria so he was thought to have UTI. He had WBC and RBC in his micro but his he did not grow out anything in the UC. He said that back in 2016 he had bladder stones. There was no imaging done in his recent ED visit. I told Nadir that we are not currently doing stone procedures, however he has not even been diagnosed with stones. I told him that he should follow up with his primary physician. He is in agreement with the plan. Maya HOWE Rn

## 2020-09-16 ENCOUNTER — TRANSCRIBE ORDERS (OUTPATIENT)
Dept: OTHER | Age: 65
End: 2020-09-16

## 2020-09-16 DIAGNOSIS — N21.0 BLADDER STONE: Primary | ICD-10-CM

## 2021-04-27 ENCOUNTER — TRANSCRIBE ORDERS (OUTPATIENT)
Dept: OTHER | Age: 66
End: 2021-04-27

## 2021-04-27 DIAGNOSIS — K63.5 POLYP OF COLON, UNSPECIFIED PART OF COLON, UNSPECIFIED TYPE: Primary | ICD-10-CM

## 2021-05-10 DIAGNOSIS — Z11.59 ENCOUNTER FOR SCREENING FOR OTHER VIRAL DISEASES: ICD-10-CM

## 2021-05-20 RX ORDER — TAMSULOSIN HYDROCHLORIDE 0.4 MG/1
CAPSULE ORAL
COMMUNITY
Start: 2021-04-16

## 2021-05-20 RX ORDER — ATORVASTATIN CALCIUM 10 MG/1
10 TABLET, FILM COATED ORAL DAILY
COMMUNITY
Start: 2021-03-27

## 2021-05-21 ENCOUNTER — ANESTHESIA EVENT (OUTPATIENT)
Dept: GASTROENTEROLOGY | Facility: CLINIC | Age: 66
End: 2021-05-21
Payer: COMMERCIAL

## 2021-05-21 DIAGNOSIS — Z11.59 ENCOUNTER FOR SCREENING FOR OTHER VIRAL DISEASES: ICD-10-CM

## 2021-05-21 PROCEDURE — U0005 INFEC AGEN DETEC AMPLI PROBE: HCPCS | Performed by: SURGERY

## 2021-05-21 PROCEDURE — U0003 INFECTIOUS AGENT DETECTION BY NUCLEIC ACID (DNA OR RNA); SEVERE ACUTE RESPIRATORY SYNDROME CORONAVIRUS 2 (SARS-COV-2) (CORONAVIRUS DISEASE [COVID-19]), AMPLIFIED PROBE TECHNIQUE, MAKING USE OF HIGH THROUGHPUT TECHNOLOGIES AS DESCRIBED BY CMS-2020-01-R: HCPCS | Performed by: SURGERY

## 2021-05-21 ASSESSMENT — LIFESTYLE VARIABLES: TOBACCO_USE: 1

## 2021-05-21 NOTE — ANESTHESIA PREPROCEDURE EVALUATION
Anesthesia Pre-Procedure Evaluation    Patient: Nadir Griffith   MRN: [id number] : 1955        Preoperative Diagnosis: Polyp of colon [K63.5]   Procedure : Procedure(s):  COLONOSCOPY     Past Medical History:   Diagnosis Date     Hypertension      Sleep apnea       Past Surgical History:   Procedure Laterality Date     COLONOSCOPY  2013    Procedure: COMBINED COLONOSCOPY THRU STOMA, BIOPSY BY SNARE;  Colonoscopy;  Surgeon: Garland Hutchinson MD;  Location: [facility]     LASER HOLMIUM LITHOTRIPSY BLADDER N/A 2016    Procedure: LASER HOLMIUM LITHOTRIPSY BLADDER;  Surgeon: EZEQUIEL Escobar MD;  Location: [facility]      Allergies   Allergen Reactions     Nka [No Known Allergies]       Social History     Tobacco Use     Smoking status: Former Smoker     Smokeless tobacco: Never Used   Substance Use Topics     Alcohol use: Yes      Wt Readings from Last 1 Encounters:   20 83.9 kg (185 lb)        Anesthesia Evaluation   Pt has had prior anesthetic. Type: General and MAC.        ROS/MED HX  ENT/Pulmonary:     (+) sleep apnea, tobacco use, Past use,     Neurologic:  - neg neurologic ROS     Cardiovascular:     (+) Dyslipidemia hypertension-----    METS/Exercise Tolerance:     Hematologic:  - neg hematologic  ROS     Musculoskeletal:  - neg musculoskeletal ROS     GI/Hepatic:  - neg GI/hepatic ROS     Renal/Genitourinary:  - neg Renal ROS     Endo:  - neg endo ROS     Psychiatric/Substance Use:  - neg psychiatric ROS     Infectious Disease:  - neg infectious disease ROS     Malignancy:  - neg malignancy ROS     Other:  - neg other ROS          Physical Exam    Airway        Mallampati: I   TM distance: > 3 FB   Neck ROM: full   Mouth opening: > 3 cm    Respiratory Devices and Support         Dental  no notable dental history         Cardiovascular   cardiovascular exam normal          Pulmonary   pulmonary exam normal                OUTSIDE LABS:  CBC:   Lab Results   Component Value Date    WBC 6.9  09/08/2020    HGB 15.2 09/08/2020    HCT 44.8 09/08/2020     09/08/2020     BMP:   Lab Results   Component Value Date     09/08/2020    POTASSIUM 3.8 09/08/2020    CHLORIDE 107 09/08/2020    CO2 26 09/08/2020    BUN 27 09/08/2020    CR 1.11 09/08/2020     (H) 09/08/2020     COAGS: No results found for: PTT, INR, FIBR  POC: No results found for: BGM, HCG, HCGS  HEPATIC: No results found for: ALBUMIN, PROTTOTAL, ALT, AST, GGT, ALKPHOS, BILITOTAL, BILIDIRECT, ZACKERY  OTHER:   Lab Results   Component Value Date    KATHY 8.4 (L) 09/08/2020       Anesthesia Plan    ASA Status:  2   NPO Status:  NPO Appropriate    Anesthesia Type: General.   Induction: Propofol.   Maintenance: TIVA.        Consents    Anesthesia Plan(s) and associated risks, benefits, and realistic alternatives discussed. Questions answered and patient/representative(s) expressed understanding.     - Discussed with:  Patient         Postoperative Care    Pain management: IV analgesics, Oral pain medications, Multi-modal analgesia.   PONV prophylaxis: Ondansetron (or other 5HT-3), Dexamethasone or Solumedrol     Comments:                CECI Alves CRNA   pt is selectively mute, as per medical chart he is from [facility]

## 2021-05-22 LAB
SARS-COV-2 RNA RESP QL NAA+PROBE: NOT DETECTED
SPECIMEN SOURCE: NORMAL

## 2021-05-24 ENCOUNTER — HOSPITAL ENCOUNTER (OUTPATIENT)
Facility: CLINIC | Age: 66
Discharge: HOME | End: 2021-05-24
Attending: SURGERY | Admitting: SURGERY
Payer: COMMERCIAL

## 2021-05-24 ENCOUNTER — ANESTHESIA (OUTPATIENT)
Dept: GASTROENTEROLOGY | Facility: CLINIC | Age: 66
End: 2021-05-24
Payer: COMMERCIAL

## 2021-05-24 VITALS
HEIGHT: 69 IN | SYSTOLIC BLOOD PRESSURE: 113 MMHG | HEART RATE: 72 BPM | RESPIRATION RATE: 14 BRPM | BODY MASS INDEX: 27.4 KG/M2 | TEMPERATURE: 98.2 F | DIASTOLIC BLOOD PRESSURE: 80 MMHG | OXYGEN SATURATION: 95 % | WEIGHT: 185 LBS

## 2021-05-24 LAB — COLONOSCOPY: NORMAL

## 2021-05-24 PROCEDURE — 250N000009 HC RX 250: Performed by: SURGERY

## 2021-05-24 PROCEDURE — 250N000009 HC RX 250: Performed by: NURSE ANESTHETIST, CERTIFIED REGISTERED

## 2021-05-24 PROCEDURE — 88305 TISSUE EXAM BY PATHOLOGIST: CPT | Mod: 26 | Performed by: PATHOLOGY

## 2021-05-24 PROCEDURE — 370N000017 HC ANESTHESIA TECHNICAL FEE, PER MIN: Performed by: SURGERY

## 2021-05-24 PROCEDURE — 88305 TISSUE EXAM BY PATHOLOGIST: CPT | Mod: TC | Performed by: SURGERY

## 2021-05-24 PROCEDURE — 45385 COLONOSCOPY W/LESION REMOVAL: CPT | Mod: PT | Performed by: SURGERY

## 2021-05-24 PROCEDURE — 258N000003 HC RX IP 258 OP 636: Performed by: SURGERY

## 2021-05-24 PROCEDURE — 250N000011 HC RX IP 250 OP 636: Performed by: NURSE ANESTHETIST, CERTIFIED REGISTERED

## 2021-05-24 RX ORDER — SODIUM CHLORIDE, SODIUM LACTATE, POTASSIUM CHLORIDE, CALCIUM CHLORIDE 600; 310; 30; 20 MG/100ML; MG/100ML; MG/100ML; MG/100ML
INJECTION, SOLUTION INTRAVENOUS CONTINUOUS
Status: DISCONTINUED | OUTPATIENT
Start: 2021-05-24 | End: 2021-05-24 | Stop reason: HOSPADM

## 2021-05-24 RX ORDER — LIDOCAINE HYDROCHLORIDE 10 MG/ML
INJECTION, SOLUTION EPIDURAL; INFILTRATION; INTRACAUDAL; PERINEURAL PRN
Status: DISCONTINUED | OUTPATIENT
Start: 2021-05-24 | End: 2021-05-24

## 2021-05-24 RX ORDER — PROPOFOL 10 MG/ML
INJECTION, EMULSION INTRAVENOUS CONTINUOUS PRN
Status: DISCONTINUED | OUTPATIENT
Start: 2021-05-24 | End: 2021-05-24

## 2021-05-24 RX ORDER — FLUTICASONE PROPIONATE 50 MCG
SPRAY, SUSPENSION (ML) NASAL
COMMUNITY
Start: 2020-12-24

## 2021-05-24 RX ORDER — ONDANSETRON 2 MG/ML
4 INJECTION INTRAMUSCULAR; INTRAVENOUS
Status: DISCONTINUED | OUTPATIENT
Start: 2021-05-24 | End: 2021-05-24 | Stop reason: HOSPADM

## 2021-05-24 RX ORDER — LIDOCAINE 40 MG/G
CREAM TOPICAL
Status: DISCONTINUED | OUTPATIENT
Start: 2021-05-24 | End: 2021-05-24 | Stop reason: HOSPADM

## 2021-05-24 RX ORDER — PROPOFOL 10 MG/ML
INJECTION, EMULSION INTRAVENOUS PRN
Status: DISCONTINUED | OUTPATIENT
Start: 2021-05-24 | End: 2021-05-24

## 2021-05-24 RX ORDER — GLYCOPYRROLATE 0.2 MG/ML
INJECTION, SOLUTION INTRAMUSCULAR; INTRAVENOUS PRN
Status: DISCONTINUED | OUTPATIENT
Start: 2021-05-24 | End: 2021-05-24

## 2021-05-24 RX ADMIN — PROPOFOL 100 MG: 10 INJECTION, EMULSION INTRAVENOUS at 07:34

## 2021-05-24 RX ADMIN — PROPOFOL 200 MCG/KG/MIN: 10 INJECTION, EMULSION INTRAVENOUS at 07:34

## 2021-05-24 RX ADMIN — SODIUM CHLORIDE, POTASSIUM CHLORIDE, SODIUM LACTATE AND CALCIUM CHLORIDE: 600; 310; 30; 20 INJECTION, SOLUTION INTRAVENOUS at 06:50

## 2021-05-24 RX ADMIN — GLYCOPYRROLATE 0.1 MG: 0.2 INJECTION, SOLUTION INTRAMUSCULAR; INTRAVENOUS at 07:34

## 2021-05-24 RX ADMIN — LIDOCAINE HYDROCHLORIDE 50 MG: 10 INJECTION, SOLUTION EPIDURAL; INFILTRATION; INTRACAUDAL; PERINEURAL at 07:34

## 2021-05-24 RX ADMIN — LIDOCAINE HYDROCHLORIDE 0.3 ML: 10 INJECTION, SOLUTION EPIDURAL; INFILTRATION; INTRACAUDAL; PERINEURAL at 06:50

## 2021-05-24 ASSESSMENT — MIFFLIN-ST. JEOR: SCORE: 1619.53

## 2021-05-24 NOTE — ANESTHESIA CARE TRANSFER NOTE
Patient: Nadir Griffith    Procedure(s):  COLONOSCOPY, FLEXIBLE, WITH LESION REMOVAL USING SNARE    Diagnosis: Polyp of colon [K63.5]  Diagnosis Additional Information: No value filed.    Anesthesia Type:   General     Note:    Oropharynx: oropharynx clear of all foreign objects  Level of Consciousness: drowsy  Oxygen Supplementation: nasal cannula    Independent Airway: airway patency satisfactory and stable  Dentition: dentition unchanged  Vital Signs Stable: post-procedure vital signs reviewed and stable  Report to RN Given: handoff report given  Patient transferred to: Phase II    Handoff Report: Identifed the Patient, Identified the Reponsible Provider, Reviewed the pertinent medical history, Discussed the surgical course, Reviewed Intra-OP anesthesia mangement and issues during anesthesia, Set expectations for post-procedure period and Allowed opportunity for questions and acknowledgement of understanding      Vitals: (Last set prior to Anesthesia Care Transfer)  CRNA VITALS  5/24/2021 0714 - 5/24/2021 0745      5/24/2021             Pulse:  75    Ht Rate:  74    SpO2:  98 %    Resp Rate (observed):  12        Electronically Signed By: CECI Alves CRNA  May 24, 2021  7:45 AM

## 2021-05-24 NOTE — ANESTHESIA POSTPROCEDURE EVALUATION
Patient: Nadir Griffith    Procedure(s):  COLONOSCOPY, FLEXIBLE, WITH LESION REMOVAL USING SNARE    Diagnosis:Polyp of colon [K63.5]  Diagnosis Additional Information: No value filed.    Anesthesia Type:  General    Note:  Disposition: Outpatient   Postop Pain Control: Uneventful            Sign Out: Well controlled pain   PONV: No   Neuro/Psych: Uneventful            Sign Out: Acceptable/Baseline neuro status   Airway/Respiratory: Uneventful            Sign Out: Acceptable/Baseline resp. status   CV/Hemodynamics: Uneventful            Sign Out: Acceptable CV status; No obvious hypovolemia; No obvious fluid overload   Other NRE: NONE   DID A NON-ROUTINE EVENT OCCUR? No           Last vitals:  Vitals:    05/24/21 0626 05/24/21 0749   BP: (!) 133/97 119/81   Pulse:  79   Resp: 18 14   Temp: 36.8  C (98.2  F)    SpO2: 98% 96%       Last vitals prior to Anesthesia Care Transfer:  CRNA VITALS  5/24/2021 0714 - 5/24/2021 0751      5/24/2021             Pulse:  75    Ht Rate:  74    SpO2:  98 %    Resp Rate (observed):  12          Electronically Signed By: CECI Alves CRNA  May 24, 2021  7:51 AM

## 2021-05-24 NOTE — H&P
"65 year old year old male here for colonoscopy for screening.    There is no problem list on file for this patient.      Past Medical History:   Diagnosis Date     Hypertension      Sleep apnea        Past Surgical History:   Procedure Laterality Date     COLONOSCOPY  11/4/2013    Procedure: COMBINED COLONOSCOPY THRU STOMA, BIOPSY BY SNARE;  Colonoscopy;  Surgeon: Garland Hutchinson MD;  Location: [facility]     LASER HOLMIUM LITHOTRIPSY BLADDER N/A 9/19/2016    Procedure: LASER HOLMIUM LITHOTRIPSY BLADDER;  Surgeon: EZEQUIEL Escobar MD;  Location: [facility]       @[facility]@    No current outpatient medications on file.       Allergies   Allergen Reactions     Gluten Meal Diarrhea     Nka [No Known Allergies]        Pt reports that he has quit smoking. He has never used smokeless tobacco. He reports current alcohol use. He reports that he does not use drugs.    Exam:  BP (!) 133/97   Temp 98.2  F (36.8  C) (Oral)   Resp 18   Ht 1.753 m (5' 9\")   Wt 83.9 kg (185 lb)   SpO2 98%   BMI 27.32 kg/m      Awake, Alert OX3  Lungs - CTA bilaterally  CV - RRR, no murmurs, distal pulses intact  Abd - soft, non-distended, non-tender, +BS  Extr - No cyanosis or edema    A/P 65 year old year old male in need of colonoscopy for screening. Risks, benefits, alternatives, and complications were discussed including the possibility of perforation and the patient agreed to proceed    Rigo Berry MD     "

## 2021-05-25 LAB — COPATH REPORT: NORMAL

## 2021-05-26 PROBLEM — K63.5 COLON POLYP: Status: ACTIVE | Noted: 2021-05-26

## 2024-11-07 ENCOUNTER — APPOINTMENT (OUTPATIENT)
Dept: GENERAL RADIOLOGY | Facility: CLINIC | Age: 69
End: 2024-11-07
Attending: EMERGENCY MEDICINE
Payer: COMMERCIAL

## 2024-11-07 ENCOUNTER — HOSPITAL ENCOUNTER (EMERGENCY)
Facility: CLINIC | Age: 69
Discharge: HOME | End: 2024-11-07
Attending: EMERGENCY MEDICINE | Admitting: EMERGENCY MEDICINE
Payer: COMMERCIAL

## 2024-11-07 VITALS
SYSTOLIC BLOOD PRESSURE: 151 MMHG | DIASTOLIC BLOOD PRESSURE: 88 MMHG | BODY MASS INDEX: 26.58 KG/M2 | RESPIRATION RATE: 18 BRPM | WEIGHT: 180 LBS | TEMPERATURE: 97.7 F | HEART RATE: 94 BPM | OXYGEN SATURATION: 95 %

## 2024-11-07 DIAGNOSIS — R07.9 ACUTE CHEST PAIN: ICD-10-CM

## 2024-11-07 LAB
ALBUMIN SERPL BCG-MCNC: 4.4 G/DL (ref 3.5–5.2)
ALP SERPL-CCNC: 57 U/L (ref 40–150)
ALT SERPL W P-5'-P-CCNC: 57 U/L (ref 0–70)
ANION GAP SERPL CALCULATED.3IONS-SCNC: 9 MMOL/L (ref 7–15)
AST SERPL W P-5'-P-CCNC: 36 U/L (ref 0–45)
BASOPHILS # BLD AUTO: 0.1 10E3/UL (ref 0–0.2)
BASOPHILS NFR BLD AUTO: 1 %
BILIRUB SERPL-MCNC: 0.3 MG/DL
BUN SERPL-MCNC: 22.3 MG/DL (ref 8–23)
CALCIUM SERPL-MCNC: 9.3 MG/DL (ref 8.8–10.4)
CHLORIDE SERPL-SCNC: 102 MMOL/L (ref 98–107)
CREAT SERPL-MCNC: 1.12 MG/DL (ref 0.67–1.17)
EGFRCR SERPLBLD CKD-EPI 2021: 72 ML/MIN/1.73M2
EOSINOPHIL # BLD AUTO: 0.1 10E3/UL (ref 0–0.7)
EOSINOPHIL NFR BLD AUTO: 2 %
ERYTHROCYTE [DISTWIDTH] IN BLOOD BY AUTOMATED COUNT: 12.3 % (ref 10–15)
GLUCOSE SERPL-MCNC: 106 MG/DL (ref 70–99)
HCO3 SERPL-SCNC: 31 MMOL/L (ref 22–29)
HCT VFR BLD AUTO: 47.5 % (ref 40–53)
HGB BLD-MCNC: 16.7 G/DL (ref 13.3–17.7)
HOLD SPECIMEN: NORMAL
IMM GRANULOCYTES # BLD: 0 10E3/UL
IMM GRANULOCYTES NFR BLD: 0 %
LYMPHOCYTES # BLD AUTO: 2.2 10E3/UL (ref 0.8–5.3)
LYMPHOCYTES NFR BLD AUTO: 32 %
MCH RBC QN AUTO: 32.2 PG (ref 26.5–33)
MCHC RBC AUTO-ENTMCNC: 35.2 G/DL (ref 31.5–36.5)
MCV RBC AUTO: 92 FL (ref 78–100)
MONOCYTES # BLD AUTO: 0.8 10E3/UL (ref 0–1.3)
MONOCYTES NFR BLD AUTO: 11 %
NEUTROPHILS # BLD AUTO: 3.7 10E3/UL (ref 1.6–8.3)
NEUTROPHILS NFR BLD AUTO: 54 %
NRBC # BLD AUTO: 0 10E3/UL
NRBC BLD AUTO-RTO: 0 /100
PLATELET # BLD AUTO: 175 10E3/UL (ref 150–450)
POTASSIUM SERPL-SCNC: 4.1 MMOL/L (ref 3.4–5.3)
PROT SERPL-MCNC: 7.2 G/DL (ref 6.4–8.3)
RBC # BLD AUTO: 5.19 10E6/UL (ref 4.4–5.9)
SODIUM SERPL-SCNC: 142 MMOL/L (ref 135–145)
TROPONIN T SERPL HS-MCNC: 6 NG/L
TROPONIN T SERPL HS-MCNC: 7 NG/L
WBC # BLD AUTO: 6.9 10E3/UL (ref 4–11)

## 2024-11-07 PROCEDURE — 84484 ASSAY OF TROPONIN QUANT: CPT | Performed by: EMERGENCY MEDICINE

## 2024-11-07 PROCEDURE — 71046 X-RAY EXAM CHEST 2 VIEWS: CPT

## 2024-11-07 PROCEDURE — 93005 ELECTROCARDIOGRAM TRACING: CPT | Performed by: EMERGENCY MEDICINE

## 2024-11-07 PROCEDURE — 93010 ELECTROCARDIOGRAM REPORT: CPT | Performed by: EMERGENCY MEDICINE

## 2024-11-07 PROCEDURE — 36415 COLL VENOUS BLD VENIPUNCTURE: CPT | Performed by: EMERGENCY MEDICINE

## 2024-11-07 PROCEDURE — 99284 EMERGENCY DEPT VISIT MOD MDM: CPT | Performed by: EMERGENCY MEDICINE

## 2024-11-07 PROCEDURE — 99285 EMERGENCY DEPT VISIT HI MDM: CPT | Mod: 25 | Performed by: EMERGENCY MEDICINE

## 2024-11-07 PROCEDURE — 80053 COMPREHEN METABOLIC PANEL: CPT | Performed by: EMERGENCY MEDICINE

## 2024-11-07 PROCEDURE — 85025 COMPLETE CBC W/AUTO DIFF WBC: CPT | Performed by: EMERGENCY MEDICINE

## 2024-11-07 PROCEDURE — 250N000013 HC RX MED GY IP 250 OP 250 PS 637: Performed by: EMERGENCY MEDICINE

## 2024-11-07 RX ORDER — MAGNESIUM HYDROXIDE/ALUMINUM HYDROXICE/SIMETHICONE 120; 1200; 1200 MG/30ML; MG/30ML; MG/30ML
30 SUSPENSION ORAL ONCE
Status: COMPLETED | OUTPATIENT
Start: 2024-11-07 | End: 2024-11-07

## 2024-11-07 RX ORDER — FAMOTIDINE 20 MG/1
20 TABLET, FILM COATED ORAL ONCE
Status: COMPLETED | OUTPATIENT
Start: 2024-11-07 | End: 2024-11-07

## 2024-11-07 RX ORDER — ASPIRIN 81 MG/1
324 TABLET, CHEWABLE ORAL ONCE
Status: COMPLETED | OUTPATIENT
Start: 2024-11-07 | End: 2024-11-07

## 2024-11-07 RX ADMIN — ASPIRIN 81 MG CHEWABLE TABLET 324 MG: 81 TABLET CHEWABLE at 01:33

## 2024-11-07 RX ADMIN — FAMOTIDINE 20 MG: 20 TABLET, FILM COATED ORAL at 01:33

## 2024-11-07 RX ADMIN — ALUMINUM HYDROXIDE, MAGNESIUM HYDROXIDE, AND SIMETHICONE 30 ML: 200; 200; 20 SUSPENSION ORAL at 01:33

## 2024-11-07 ASSESSMENT — COLUMBIA-SUICIDE SEVERITY RATING SCALE - C-SSRS
1. IN THE PAST MONTH, HAVE YOU WISHED YOU WERE DEAD OR WISHED YOU COULD GO TO SLEEP AND NOT WAKE UP?: NO
6. HAVE YOU EVER DONE ANYTHING, STARTED TO DO ANYTHING, OR PREPARED TO DO ANYTHING TO END YOUR LIFE?: NO
2. HAVE YOU ACTUALLY HAD ANY THOUGHTS OF KILLING YOURSELF IN THE PAST MONTH?: NO

## 2024-11-07 ASSESSMENT — ACTIVITIES OF DAILY LIVING (ADL)
ADLS_ACUITY_SCORE: 0
ADLS_ACUITY_SCORE: 0

## 2024-11-07 NOTE — ED PROVIDER NOTES
History     Chief Complaint   Patient presents with    Chest Pain     HPI  Nadir Griffith is a 69 year old male who presents for chest pain.  The patient reports that about 1 hour prior to arrival he had tightness develop in his chest while he was laying in bed.  Mild discomfort.  He reports some mild feelings of shortness of breath.  No diaphoresis, nausea, or vomiting.  He is never had symptoms like this before.  He has not taking thing for symptoms.  He is not sure if there is anything that makes it better or worse.  No recent bloody stools or lower extremity swelling.    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    Social History:  Marital Status:  Single [1]  Social History     Tobacco Use    Smoking status: Former    Smokeless tobacco: Never   Substance Use Topics    Alcohol use: Yes    Drug use: Never        Medications:    atorvastatin (LIPITOR) 10 MG tablet  fluticasone (FLONASE) 50 MCG/ACT nasal spray  lisinopril (PRINIVIL,ZESTRIL) 20 MG tablet  potassium citrate (POTASSIUM CITRATE) 10 MEQ (1080 MG) SR tablet  tadalafil (CIALIS) 20 MG tablet  tamsulosin (FLOMAX) 0.4  MG capsule          Review of Systems    Physical Exam   BP: (!) 173/108  Pulse: 91  Temp: 97.7  F (36.5  C)  Resp: 18  Weight: 81.6 kg (180 lb)  SpO2: 97 %      Physical Exam  Constitutional:       General: He is not in acute distress.     Appearance: He is well-developed.   HENT:      Head: Normocephalic and atraumatic.   Cardiovascular:      Rate and Rhythm: Normal rate.      Pulses:           Radial pulses are 2+ on the right side and 2+ on the left side.        Posterior tibial pulses are 2+ on the right side and 2+ on the left side.   Pulmonary:      Effort: No respiratory distress.      Breath sounds: No stridor.   Chest:      Chest wall: No tenderness.   Skin:     General: Skin is warm and dry.   Neurological:      Mental Status: He is alert.         ED Course        Procedures              EKG Interpretation:      Interpreted by Gera West MD  Time reviewed: 0105  Symptoms at time of EKG: chest pain   Rhythm: normal sinus   Rate: normal  Axis: normal  Ectopy: none  Conduction: RBBB  ST Segments/ T Waves: No ST-T wave changes  Q Waves: none  Comparison to prior: No old EKG available    Clinical Impression: sinus rhythm with RBBB      Critical Care time:  none              Results for orders placed or performed during the hospital encounter of 11/07/24 (from the past 24 hours)   Comprehensive metabolic panel   Result Value Ref Range    Sodium 142 135 - 145 mmol/L    Potassium 4.1 3.4 - 5.3 mmol/L    Carbon Dioxide (CO2) 31 (H) 22 - 29 mmol/L    Anion Gap 9 7 - 15 mmol/L    Urea Nitrogen 22.3 8.0 - 23.0 mg/dL    Creatinine 1.12 0.67 - 1.17 mg/dL    GFR Estimate 72 >60 mL/min/1.73m2    Calcium 9.3 8.8 - 10.4 mg/dL    Chloride 102 98 - 107 mmol/L    Glucose 106 (H) 70 - 99 mg/dL    Alkaline Phosphatase 57 40 - 150 U/L    AST 36 0 - 45 U/L    ALT 57 0 - 70 U/L    Protein Total 7.2 6.4 - 8.3 g/dL    Albumin 4.4 3.5 - 5.2 g/dL    Bilirubin Total 0.3 <=1.2 mg/dL   CBC with platelets, differential     Narrative    The following orders were created for panel order CBC with platelets, differential.  Procedure                               Abnormality         Status                     ---------                               -----------         ------                     CBC with platelets and d...[[id number]]                      Final result                 Please view results for these tests on the individual orders.   Troponin T, High Sensitivity   Result Value Ref Range    Troponin T, High Sensitivity 7 <=22 ng/L   [place] Draw    Narrative    The following orders were created for panel order [place] Draw.  Procedure                               Abnormality         Status                     ---------                               -----------         ------                     Extra Blue Top Tube[[id number]]                              Final result                 Please view results for these tests on the individual orders.   CBC with platelets and differential   Result Value Ref Range    WBC Count 6.9 4.0 - 11.0 10e3/uL    RBC Count 5.19 4.40 - 5.90 10e6/uL    Hemoglobin 16.7 13.3 - 17.7 g/dL    Hematocrit 47.5 40.0 - 53.0 %    MCV 92 78 - 100 fL    MCH 32.2 26.5 - 33.0 pg    MCHC 35.2 31.5 - 36.5 g/dL    RDW 12.3 10.0 - 15.0 %    Platelet Count 175 150 - 450 10e3/uL    % Neutrophils 54 %    % Lymphocytes 32 %    % Monocytes 11 %    % Eosinophils 2 %    % Basophils 1 %    % Immature Granulocytes 0 %    NRBCs per 100 WBC 0 <1 /100    Absolute Neutrophils 3.7 1.6 - 8.3 10e3/uL    Absolute Lymphocytes 2.2 0.8 - 5.3 10e3/uL    Absolute Monocytes 0.8 0.0 - 1.3 10e3/uL    Absolute Eosinophils 0.1 0.0 - 0.7 10e3/uL    Absolute Basophils 0.1 0.0 - 0.2 10e3/uL    Absolute Immature Granulocytes 0.0 <=0.4 10e3/uL    Absolute NRBCs 0.0 10e3/uL   Extra Blue Top Tube   Result Value Ref Range    Hold Specimen     XR Chest 2 Views    Narrative    EXAM: XR CHEST 2 VIEWS  LOCATION: [facility]  DATE:  11/7/2024    INDICATION: chest pain  COMPARISON: None.      Impression    IMPRESSION: Negative chest.   Troponin T, High Sensitivity   Result Value Ref Range    Troponin T, High Sensitivity 6 <=22 ng/L       Medications   aspirin (ASA) chewable tablet 324 mg (324 mg Oral $Given 11/7/24 0133)   alum & mag hydroxide-simethicone (MAALOX) suspension 30 mL (30 mLs Oral $Given 11/7/24 0133)   famotidine (PEPCID) tablet 20 mg (20 mg Oral $Given 11/7/24 0133)       Assessments & Plan (with Medical Decision Making)   69-year-old male presents with chest pain.  Vital signs are reassuring.  EKG is sinus rhythm without signs of ischemia or dysrhythmia.  Good pulses in all 4 extremities, unlikely aortic dissection.  He is given aspirin, Maalox, and famotidine for symptoms.  Hemoglobin is normal, no signs of anemia.  Troponin initially is normal which is reassuring and repeat troponin is again normal making ACS unlikely.  Chest x-ray obtained, images interpreted independently as well as radiology read reviewed, no signs of infiltrate to suggest pneumonia, no signs of pneumothorax or heart failure or widened mediastinum.  Hospitalization considered given the patient's symptoms but at this time I believe he is safe to discharge home with instructions to return if he has worsening of his symptoms or other concerns, otherwise follow-up in clinic.  The patient is in agreement with this plan.    I have reviewed the nursing notes.    I have reviewed the findings, diagnosis, plan and need for follow up with the patient.           New Prescriptions    No medications on file       Final diagnoses:   Acute chest pain       11/7/2024   [facility] EMERGENCY DEPT       Gera West MD  11/07/24 3545

## 2024-11-07 NOTE — ED TRIAGE NOTES
"Pt presents with concerns for mid sternal chest tightness that began about 1.5 hours ago. Per pt he was lying in bed trying to fall asleep, but he \"didn't feel right\" and developed chest tightness. Pt denies cardiac history.      Triage Assessment (Adult)       Row Name 11/07/24 0100          Triage Assessment    Airway WDL WDL        Respiratory WDL    Respiratory WDL WDL        Skin Circulation/Temperature WDL    Skin Circulation/Temperature WDL WDL        Cardiac WDL    Cardiac WDL X;chest pain     Cardiac Rhythm NSR        Chest Pain Assessment    Chest Pain Location midsternal     Character tightness     Chest Pain Intervention cardiac biomarkers drawn;cardiac monitoring continued;cardiac monitor placed;12-lead ECG obtained;activity minimized        Peripheral/Neurovascular WDL    Peripheral Neurovascular WDL WDL     Capillary Refill, General less than/equal to 3 secs        Cognitive/Neuro/Behavioral WDL    Cognitive/Neuro/Behavioral WDL WDL        [place] Coma Scale    Best Eye Response 4-->(E4) spontaneous     Best Motor Response 6-->(M6) obeys commands     Best Verbal Response 5-->(V5) oriented     [place] Coma Scale Score 15                     "

## 2024-11-07 NOTE — DISCHARGE INSTRUCTIONS
So far the tests have been reassuring.  I am glad you are feeling better.  I do not know what is causing your pain but it does not appear to be one of the dangerous causes of chest pain at this time.  Drink plenty fluids and use acetaminophen as needed.  Return if you are having worsening of your symptoms, difficulty breathing, repeated vomiting, or other concerns.  Otherwise follow-up in clinic.

## 2025-03-19 ENCOUNTER — TRANSCRIBE ORDERS (OUTPATIENT)
Dept: OTHER | Age: 70
End: 2025-03-19

## 2025-03-19 DIAGNOSIS — I21.4 ACUTE NON-ST ELEVATION MYOCARDIAL INFARCTION (NSTEMI) (H): Primary | ICD-10-CM

## 2025-03-24 ENCOUNTER — HOSPITAL ENCOUNTER (OUTPATIENT)
Dept: CARDIAC REHAB | Facility: CLINIC | Age: 70
Discharge: HOME | End: 2025-03-24
Attending: FAMILY MEDICINE
Payer: COMMERCIAL

## 2025-03-24 PROCEDURE — 93797 PHYS/QHP OP CAR RHAB WO ECG: CPT

## 2025-03-24 PROCEDURE — 93798 PHYS/QHP OP CAR RHAB W/ECG: CPT

## 2025-03-26 ENCOUNTER — HOSPITAL ENCOUNTER (OUTPATIENT)
Dept: CARDIAC REHAB | Facility: CLINIC | Age: 70
Discharge: HOME | End: 2025-03-26
Attending: STUDENT IN AN ORGANIZED HEALTH CARE EDUCATION/TRAINING PROGRAM
Payer: COMMERCIAL

## 2025-03-26 PROCEDURE — 93797 PHYS/QHP OP CAR RHAB WO ECG: CPT

## 2025-03-26 PROCEDURE — 93798 PHYS/QHP OP CAR RHAB W/ECG: CPT

## 2025-03-28 ENCOUNTER — HOSPITAL ENCOUNTER (OUTPATIENT)
Dept: CARDIAC REHAB | Facility: CLINIC | Age: 70
Discharge: HOME | End: 2025-03-28
Attending: STUDENT IN AN ORGANIZED HEALTH CARE EDUCATION/TRAINING PROGRAM
Payer: COMMERCIAL

## 2025-03-28 PROCEDURE — 93798 PHYS/QHP OP CAR RHAB W/ECG: CPT

## 2025-03-31 ENCOUNTER — HOSPITAL ENCOUNTER (OUTPATIENT)
Dept: CARDIAC REHAB | Facility: CLINIC | Age: 70
Discharge: HOME | End: 2025-03-31
Attending: STUDENT IN AN ORGANIZED HEALTH CARE EDUCATION/TRAINING PROGRAM
Payer: COMMERCIAL

## 2025-03-31 PROCEDURE — 93798 PHYS/QHP OP CAR RHAB W/ECG: CPT

## 2025-04-02 ENCOUNTER — HOSPITAL ENCOUNTER (OUTPATIENT)
Dept: CARDIAC REHAB | Facility: CLINIC | Age: 70
Discharge: HOME | End: 2025-04-02
Attending: STUDENT IN AN ORGANIZED HEALTH CARE EDUCATION/TRAINING PROGRAM
Payer: COMMERCIAL

## 2025-04-02 PROCEDURE — 93798 PHYS/QHP OP CAR RHAB W/ECG: CPT

## 2025-04-04 ENCOUNTER — HOSPITAL ENCOUNTER (OUTPATIENT)
Dept: CARDIAC REHAB | Facility: CLINIC | Age: 70
Discharge: HOME | End: 2025-04-04
Attending: STUDENT IN AN ORGANIZED HEALTH CARE EDUCATION/TRAINING PROGRAM
Payer: COMMERCIAL

## 2025-04-04 PROCEDURE — 93798 PHYS/QHP OP CAR RHAB W/ECG: CPT | Performed by: REHABILITATION PRACTITIONER

## 2025-04-07 ENCOUNTER — OFFICE VISIT (OUTPATIENT)
Dept: SPIRITUAL SERVICES | Facility: CLINIC | Age: 70
End: 2025-04-07
Payer: COMMERCIAL

## 2025-04-07 ENCOUNTER — HOSPITAL ENCOUNTER (OUTPATIENT)
Dept: CARDIAC REHAB | Facility: CLINIC | Age: 70
Discharge: HOME | End: 2025-04-07
Attending: STUDENT IN AN ORGANIZED HEALTH CARE EDUCATION/TRAINING PROGRAM
Payer: COMMERCIAL

## 2025-04-07 PROCEDURE — 93797 PHYS/QHP OP CAR RHAB WO ECG: CPT

## 2025-04-07 PROCEDURE — 93798 PHYS/QHP OP CAR RHAB W/ECG: CPT

## 2025-04-07 NOTE — PROGRESS NOTES
Spirituality Group Note    UNIT - [facility]    Name:    Nadir Griffith                                                                     YOB: 1955    MRN:     [id number]                                                                       Age: 69 year old      Patient attended -led group, which included discussion of Coping with Depression and Anxiety during Serious Illness, Emotional Responses to Cardiac Illness, and The Healing Process as it relates to coping with stress.    Patient attended group for 1.0 hrs.    The patient actively participated in group discussion.  Jonathan shared about his heart attack and engaged in several of the topics discussed in the class.  He lives alone but talks often with his cousins and other friends he has contacted about his recent health issues.      Ken Castellanos M.A., [facility]  Staff    Spiritual Health Services  [facility]  [phone] (Office)  [email or web address]

## 2025-04-09 ENCOUNTER — HOSPITAL ENCOUNTER (OUTPATIENT)
Dept: CARDIAC REHAB | Facility: CLINIC | Age: 70
Discharge: HOME | End: 2025-04-09
Attending: STUDENT IN AN ORGANIZED HEALTH CARE EDUCATION/TRAINING PROGRAM
Payer: COMMERCIAL

## 2025-04-09 PROCEDURE — 93798 PHYS/QHP OP CAR RHAB W/ECG: CPT

## 2025-04-11 ENCOUNTER — HOSPITAL ENCOUNTER (OUTPATIENT)
Dept: CARDIAC REHAB | Facility: CLINIC | Age: 70
Discharge: HOME | End: 2025-04-11
Attending: STUDENT IN AN ORGANIZED HEALTH CARE EDUCATION/TRAINING PROGRAM
Payer: COMMERCIAL

## 2025-04-11 PROCEDURE — 93798 PHYS/QHP OP CAR RHAB W/ECG: CPT

## 2025-04-14 ENCOUNTER — HOSPITAL ENCOUNTER (OUTPATIENT)
Dept: CARDIAC REHAB | Facility: CLINIC | Age: 70
Discharge: HOME | End: 2025-04-14
Attending: STUDENT IN AN ORGANIZED HEALTH CARE EDUCATION/TRAINING PROGRAM
Payer: COMMERCIAL

## 2025-04-14 PROCEDURE — 93798 PHYS/QHP OP CAR RHAB W/ECG: CPT

## 2025-04-16 ENCOUNTER — HOSPITAL ENCOUNTER (OUTPATIENT)
Dept: CARDIAC REHAB | Facility: CLINIC | Age: 70
Discharge: HOME | End: 2025-04-16
Attending: STUDENT IN AN ORGANIZED HEALTH CARE EDUCATION/TRAINING PROGRAM
Payer: COMMERCIAL

## 2025-04-16 PROCEDURE — 93798 PHYS/QHP OP CAR RHAB W/ECG: CPT

## 2025-04-18 ENCOUNTER — HOSPITAL ENCOUNTER (OUTPATIENT)
Dept: CARDIAC REHAB | Facility: CLINIC | Age: 70
Discharge: HOME | End: 2025-04-18
Attending: STUDENT IN AN ORGANIZED HEALTH CARE EDUCATION/TRAINING PROGRAM
Payer: COMMERCIAL

## 2025-04-18 PROCEDURE — 93798 PHYS/QHP OP CAR RHAB W/ECG: CPT

## 2025-04-21 ENCOUNTER — HOSPITAL ENCOUNTER (OUTPATIENT)
Dept: CARDIAC REHAB | Facility: CLINIC | Age: 70
Discharge: HOME | End: 2025-04-21
Attending: STUDENT IN AN ORGANIZED HEALTH CARE EDUCATION/TRAINING PROGRAM
Payer: COMMERCIAL

## 2025-04-21 PROCEDURE — 93798 PHYS/QHP OP CAR RHAB W/ECG: CPT

## 2025-04-23 ENCOUNTER — HOSPITAL ENCOUNTER (OUTPATIENT)
Dept: CARDIAC REHAB | Facility: CLINIC | Age: 70
Discharge: HOME | End: 2025-04-23
Attending: STUDENT IN AN ORGANIZED HEALTH CARE EDUCATION/TRAINING PROGRAM
Payer: COMMERCIAL

## 2025-04-23 PROCEDURE — 93798 PHYS/QHP OP CAR RHAB W/ECG: CPT

## 2025-04-25 ENCOUNTER — HOSPITAL ENCOUNTER (OUTPATIENT)
Dept: CARDIAC REHAB | Facility: CLINIC | Age: 70
Discharge: HOME | End: 2025-04-25
Attending: STUDENT IN AN ORGANIZED HEALTH CARE EDUCATION/TRAINING PROGRAM
Payer: COMMERCIAL

## 2025-04-25 PROCEDURE — 93798 PHYS/QHP OP CAR RHAB W/ECG: CPT

## 2025-04-28 ENCOUNTER — HOSPITAL ENCOUNTER (OUTPATIENT)
Dept: CARDIAC REHAB | Facility: CLINIC | Age: 70
Discharge: HOME | End: 2025-04-28
Attending: STUDENT IN AN ORGANIZED HEALTH CARE EDUCATION/TRAINING PROGRAM
Payer: COMMERCIAL

## 2025-04-28 PROCEDURE — 93798 PHYS/QHP OP CAR RHAB W/ECG: CPT

## 2025-04-30 ENCOUNTER — HOSPITAL ENCOUNTER (OUTPATIENT)
Dept: CARDIAC REHAB | Facility: CLINIC | Age: 70
Discharge: HOME | End: 2025-04-30
Attending: STUDENT IN AN ORGANIZED HEALTH CARE EDUCATION/TRAINING PROGRAM
Payer: COMMERCIAL

## 2025-04-30 PROCEDURE — 93798 PHYS/QHP OP CAR RHAB W/ECG: CPT

## 2025-05-02 ENCOUNTER — HOSPITAL ENCOUNTER (OUTPATIENT)
Dept: CARDIAC REHAB | Facility: CLINIC | Age: 70
Discharge: HOME | End: 2025-05-02
Attending: STUDENT IN AN ORGANIZED HEALTH CARE EDUCATION/TRAINING PROGRAM
Payer: COMMERCIAL

## 2025-05-02 PROCEDURE — 93798 PHYS/QHP OP CAR RHAB W/ECG: CPT

## 2025-05-05 ENCOUNTER — HOSPITAL ENCOUNTER (OUTPATIENT)
Dept: CARDIAC REHAB | Facility: CLINIC | Age: 70
Discharge: HOME | End: 2025-05-05
Attending: STUDENT IN AN ORGANIZED HEALTH CARE EDUCATION/TRAINING PROGRAM
Payer: COMMERCIAL

## 2025-05-05 PROCEDURE — 93798 PHYS/QHP OP CAR RHAB W/ECG: CPT

## 2025-05-07 ENCOUNTER — HOSPITAL ENCOUNTER (OUTPATIENT)
Dept: CARDIAC REHAB | Facility: CLINIC | Age: 70
Discharge: HOME | End: 2025-05-07
Attending: STUDENT IN AN ORGANIZED HEALTH CARE EDUCATION/TRAINING PROGRAM
Payer: COMMERCIAL

## 2025-05-07 PROCEDURE — 93798 PHYS/QHP OP CAR RHAB W/ECG: CPT

## 2025-05-09 ENCOUNTER — HOSPITAL ENCOUNTER (OUTPATIENT)
Dept: CARDIAC REHAB | Facility: CLINIC | Age: 70
Discharge: HOME | End: 2025-05-09
Attending: STUDENT IN AN ORGANIZED HEALTH CARE EDUCATION/TRAINING PROGRAM
Payer: COMMERCIAL

## 2025-05-09 PROCEDURE — 93798 PHYS/QHP OP CAR RHAB W/ECG: CPT

## 2025-05-12 ENCOUNTER — HOSPITAL ENCOUNTER (OUTPATIENT)
Dept: CARDIAC REHAB | Facility: CLINIC | Age: 70
Discharge: HOME | End: 2025-05-12
Attending: STUDENT IN AN ORGANIZED HEALTH CARE EDUCATION/TRAINING PROGRAM
Payer: COMMERCIAL

## 2025-05-12 PROCEDURE — 93798 PHYS/QHP OP CAR RHAB W/ECG: CPT

## 2025-05-14 ENCOUNTER — HOSPITAL ENCOUNTER (OUTPATIENT)
Dept: CARDIAC REHAB | Facility: CLINIC | Age: 70
Discharge: HOME | End: 2025-05-14
Attending: STUDENT IN AN ORGANIZED HEALTH CARE EDUCATION/TRAINING PROGRAM
Payer: COMMERCIAL

## 2025-05-14 PROCEDURE — 93798 PHYS/QHP OP CAR RHAB W/ECG: CPT

## 2025-05-16 ENCOUNTER — HOSPITAL ENCOUNTER (OUTPATIENT)
Dept: CARDIAC REHAB | Facility: CLINIC | Age: 70
Discharge: HOME | End: 2025-05-16
Attending: STUDENT IN AN ORGANIZED HEALTH CARE EDUCATION/TRAINING PROGRAM
Payer: COMMERCIAL

## 2025-05-16 PROCEDURE — 93798 PHYS/QHP OP CAR RHAB W/ECG: CPT

## 2025-05-19 ENCOUNTER — HOSPITAL ENCOUNTER (OUTPATIENT)
Dept: CARDIAC REHAB | Facility: CLINIC | Age: 70
Discharge: HOME | End: 2025-05-19
Attending: STUDENT IN AN ORGANIZED HEALTH CARE EDUCATION/TRAINING PROGRAM
Payer: COMMERCIAL

## 2025-05-19 PROCEDURE — 93798 PHYS/QHP OP CAR RHAB W/ECG: CPT

## 2025-05-19 PROCEDURE — 93797 PHYS/QHP OP CAR RHAB WO ECG: CPT

## 2025-05-21 ENCOUNTER — HOSPITAL ENCOUNTER (OUTPATIENT)
Dept: CARDIAC REHAB | Facility: CLINIC | Age: 70
Discharge: HOME | End: 2025-05-21
Attending: STUDENT IN AN ORGANIZED HEALTH CARE EDUCATION/TRAINING PROGRAM
Payer: COMMERCIAL

## 2025-05-21 PROCEDURE — 93798 PHYS/QHP OP CAR RHAB W/ECG: CPT

## 2025-05-23 ENCOUNTER — HOSPITAL ENCOUNTER (OUTPATIENT)
Dept: CARDIAC REHAB | Facility: CLINIC | Age: 70
Discharge: HOME | End: 2025-05-23
Attending: STUDENT IN AN ORGANIZED HEALTH CARE EDUCATION/TRAINING PROGRAM
Payer: COMMERCIAL

## 2025-05-23 PROCEDURE — 93798 PHYS/QHP OP CAR RHAB W/ECG: CPT

## 2025-05-28 ENCOUNTER — HOSPITAL ENCOUNTER (OUTPATIENT)
Dept: CARDIAC REHAB | Facility: CLINIC | Age: 70
Discharge: HOME | End: 2025-05-28
Attending: STUDENT IN AN ORGANIZED HEALTH CARE EDUCATION/TRAINING PROGRAM
Payer: COMMERCIAL

## 2025-05-28 PROCEDURE — 93798 PHYS/QHP OP CAR RHAB W/ECG: CPT

## 2025-05-30 ENCOUNTER — HOSPITAL ENCOUNTER (OUTPATIENT)
Dept: CARDIAC REHAB | Facility: CLINIC | Age: 70
Discharge: HOME | End: 2025-05-30
Attending: STUDENT IN AN ORGANIZED HEALTH CARE EDUCATION/TRAINING PROGRAM
Payer: COMMERCIAL

## 2025-05-30 PROCEDURE — 93798 PHYS/QHP OP CAR RHAB W/ECG: CPT

## 2025-06-02 ENCOUNTER — HOSPITAL ENCOUNTER (OUTPATIENT)
Dept: CARDIAC REHAB | Facility: CLINIC | Age: 70
Discharge: HOME | End: 2025-06-02
Attending: STUDENT IN AN ORGANIZED HEALTH CARE EDUCATION/TRAINING PROGRAM
Payer: COMMERCIAL

## 2025-06-02 PROCEDURE — 93798 PHYS/QHP OP CAR RHAB W/ECG: CPT

## 2025-06-04 ENCOUNTER — HOSPITAL ENCOUNTER (OUTPATIENT)
Dept: CARDIAC REHAB | Facility: CLINIC | Age: 70
Discharge: HOME | End: 2025-06-04
Attending: STUDENT IN AN ORGANIZED HEALTH CARE EDUCATION/TRAINING PROGRAM
Payer: COMMERCIAL

## 2025-06-04 PROCEDURE — 93797 PHYS/QHP OP CAR RHAB WO ECG: CPT

## 2025-06-04 PROCEDURE — 93798 PHYS/QHP OP CAR RHAB W/ECG: CPT

## (undated) RX ORDER — GLYCOPYRROLATE 0.2 MG/ML
INJECTION, SOLUTION INTRAMUSCULAR; INTRAVENOUS
Status: DISPENSED
Start: 2021-05-24

## (undated) RX ORDER — LIDOCAINE HYDROCHLORIDE 10 MG/ML
INJECTION, SOLUTION EPIDURAL; INFILTRATION; INTRACAUDAL; PERINEURAL
Status: DISPENSED
Start: 2021-05-24

## (undated) RX ORDER — PROPOFOL 10 MG/ML
INJECTION, EMULSION INTRAVENOUS
Status: DISPENSED
Start: 2021-05-24